# Patient Record
Sex: FEMALE | Race: WHITE | Employment: FULL TIME | URBAN - METROPOLITAN AREA
[De-identification: names, ages, dates, MRNs, and addresses within clinical notes are randomized per-mention and may not be internally consistent; named-entity substitution may affect disease eponyms.]

---

## 2019-01-31 ENCOUNTER — OFFICE VISIT (OUTPATIENT)
Dept: ORTHOPEDIC SURGERY | Age: 59
End: 2019-01-31

## 2019-01-31 VITALS
SYSTOLIC BLOOD PRESSURE: 143 MMHG | HEIGHT: 67 IN | HEART RATE: 79 BPM | WEIGHT: 160 LBS | BODY MASS INDEX: 25.11 KG/M2 | OXYGEN SATURATION: 98 % | TEMPERATURE: 97.7 F | DIASTOLIC BLOOD PRESSURE: 81 MMHG | RESPIRATION RATE: 16 BRPM

## 2019-01-31 DIAGNOSIS — M25.561 CHRONIC PAIN OF RIGHT KNEE: ICD-10-CM

## 2019-01-31 DIAGNOSIS — G89.29 CHRONIC PAIN OF RIGHT KNEE: ICD-10-CM

## 2019-01-31 DIAGNOSIS — M25.361 INSTABILITY OF RIGHT KNEE JOINT: Primary | ICD-10-CM

## 2019-01-31 DIAGNOSIS — Z96.651 HISTORY OF RIGHT KNEE JOINT REPLACEMENT: ICD-10-CM

## 2019-01-31 RX ORDER — CLOBETASOL PROPIONATE 0.05 G/100ML
SHAMPOO TOPICAL
COMMUNITY
Start: 2018-11-06 | End: 2022-08-03

## 2019-01-31 RX ORDER — SUMATRIPTAN 50 MG/1
TABLET, FILM COATED ORAL
COMMUNITY
Start: 2018-12-13

## 2019-01-31 RX ORDER — TELMISARTAN AND HYDROCHLORTHIAZIDE 40; 12.5 MG/1; MG/1
TABLET ORAL
COMMUNITY
Start: 2019-01-24

## 2019-01-31 RX ORDER — GUAIFENESIN AND PSEUDOEPHEDRINE HYDROCHLORIDE 600; 60 MG/1; MG/1
TABLET, EXTENDED RELEASE ORAL
COMMUNITY
Start: 2018-12-28

## 2019-01-31 RX ORDER — PANTOPRAZOLE SODIUM 40 MG/1
TABLET, DELAYED RELEASE ORAL
COMMUNITY
Start: 2019-01-27

## 2019-01-31 RX ORDER — OXYMETAZOLINE HCL 0.05 %
SPRAY, NON-AEROSOL (ML) NASAL
COMMUNITY
Start: 2018-12-14

## 2019-01-31 RX ORDER — BUPROPION HYDROCHLORIDE 150 MG/1
TABLET ORAL
Refills: 0 | COMMUNITY
Start: 2018-11-12

## 2019-01-31 RX ORDER — METOPROLOL SUCCINATE 100 MG/1
TABLET, EXTENDED RELEASE ORAL
COMMUNITY
Start: 2018-10-28

## 2019-01-31 RX ORDER — CARBOXYMETHYLCELLULOSE SODIUM 5 MG/ML
SOLUTION/ DROPS OPHTHALMIC
COMMUNITY
Start: 2018-12-17

## 2019-01-31 RX ORDER — GUAIFENESIN 600 MG/1
TABLET, EXTENDED RELEASE ORAL
COMMUNITY
Start: 2018-12-14

## 2019-01-31 RX ORDER — RIZATRIPTAN BENZOATE 10 MG/1
TABLET ORAL
COMMUNITY
Start: 2018-12-14

## 2019-01-31 RX ORDER — MINERAL OIL
ENEMA (ML) RECTAL
COMMUNITY
Start: 2019-01-29

## 2019-01-31 RX ORDER — AZITHROMYCIN 500 MG/1
TABLET, FILM COATED ORAL
COMMUNITY
Start: 2018-12-19 | End: 2022-08-03

## 2019-01-31 RX ORDER — METHYLPHENIDATE HYDROCHLORIDE 72 MG/1
TABLET, EXTENDED RELEASE ORAL
COMMUNITY
Start: 2018-12-20

## 2019-01-31 RX ORDER — FLUCONAZOLE 150 MG/1
TABLET ORAL
COMMUNITY
Start: 2018-12-19 | End: 2022-08-03

## 2019-01-31 RX ORDER — ACETAMINOPHEN 500 MG/1
TABLET, FILM COATED ORAL
COMMUNITY
Start: 2018-12-14

## 2019-01-31 RX ORDER — SIMETHICONE 80 MG
TABLET,CHEWABLE ORAL
COMMUNITY
Start: 2019-01-27

## 2019-01-31 RX ORDER — FERROUS FUMARATE/DOCUSATE 150-100 MG
TABLET, EXTENDED RELEASE ORAL
COMMUNITY
Start: 2018-12-14

## 2019-01-31 RX ORDER — ZOLPIDEM TARTRATE 10 MG/1
TABLET ORAL
COMMUNITY
Start: 2018-11-30

## 2019-01-31 RX ORDER — FLUTICASONE PROPIONATE 50 MCG
SPRAY, SUSPENSION (ML) NASAL
COMMUNITY
Start: 2018-12-14

## 2019-01-31 NOTE — PROGRESS NOTES
Patient: Natanael Silvestre                MRN: [id number]       SSN: [id number]  YOB: 1960        AGE: 59 y.o. SEX: female  Body mass index is 25.44 kg/m². PCP: No primary care provider on file. 01/31/19    HISTORY:  I had the pleasure of seeing Natanael Silvestre for opinion and advice with regards to her knee. Dr. Marcelino Mckay at [facility] replaced her knee. It looks like she has a Kristian cruciate-retaining knee. She had a knee replacement done about three or four years ago after a knee scope, and she has developed significant instability, and that is her main complaint, wobbliness of the knee. It does swell on her. She had a bone scan at the [facility] about one year ago, and it is incomplete. We are going to get the full result. I am also going to get her OP note, which shows likely a Kristian replacement by Dr. Dav Pelletier. She denies having problems with infection or DVT. She works in the school as an assistant, and she is on her feet many hours a day. She is having difficulty with it and noticed that she has become more valgused or knock-kneed associated with this. PHYSICAL EXAMINATION:  On examination today, she has a touch of recurvatum with the knee itself. There is a well healed incision. The quadriceps are intact, and she has severe mid-flexion instability associated with it. RADIOGRAPHS:  On x-rays, she has a Kristian replacement with a little bit a lysis behind the tray. I do not think the implants are grossly loose, but I would like to have a look at the bone scan. There may be some mild loosening on the tibia side and a fair bit of cement down the canal.     PLAN:  She is not happy to hear that she needs a revision. Depending on the bone scan, as she has no start-up pain, we could consider a larger bearing, and I think it is about 50/50 whether this will work, because this is likely a cruciate-retaining knee. She may need a full revision to a constrained implant. The other thing we discussed was chronic pain. She may not be able to stand on concrete floors for eight hours at a time. We can certainly improve the stability, but her standing tolerance may be limited, and I wanted to explain this to her. Also, infection, DVT, pulmonary embolism, anesthetic complications, blood loss requiring transfusion, pain, stiffness, and other complications were discussed. We are going to see her back in a couple of weeks time with some infectious labs. I would like to see the bone scan report, and I would also like to see the operative note as well. cc:  Jayce Anderson M.D. REVIEW OF SYSTEMS:      CON: negative for weight loss, fever  EYE: negative for double vision  ENT: negative for hoarseness  RS:   negative for Tb  GI:    negative for blood in stool  :  negative for blood in urine  Other systems reviewed and noted below. History reviewed. No pertinent past medical history. History reviewed. No pertinent family history.     Current Outpatient Medications   Medication Sig Dispense Refill    TYLENOL EXTRA STRENGTH 500 mg tablet       buPROPion XL (WELLBUTRIN XL) 150 mg tablet take 1 tablet by mouth IN THE MORNING FOR 3 MONTHS  0    REFRESH TEARS 0.5 % drop ophthalmic solution       fexofenadine (ALLEGRA) 180 mg tablet       fluconazole (DIFLUCAN) 150 mg tablet       MUCINEX 600 mg ER tablet       methylphenidate HCl 72 mg tr24       metoprolol succinate (TOPROL-XL) 100 mg tablet       pantoprazole (PROTONIX) 40 mg tablet       NASAL DECONGESTANT, PSEUDOEPH, 30 mg tablet       rizatriptan (MAXALT) 10 mg tablet       SUMAtriptan (IMITREX) 50 mg tablet       telmisartan-hydroCHLOROthiazide (MICARDIS HCT) 40-12.5 mg per tablet       zolpidem (AMBIEN) 10 mg tablet       simethicone (MYLICON) 80 mg chewable tablet       fluticasone (FLONASE) 50 mcg/actuation nasal spray       CLOBEX 0.05 % sham       azithromycin (ZITHROMAX) 500 mg tab       NASAL DECONGESTANT, OXYMETAZL, 0.05 % nasal spray       MUCINEX D  mg per tablet          Allergies   Allergen Reactions    Vicodin [Hydrocodone-Acetaminophen] Nausea and Vomiting       Past Surgical History:   Procedure Laterality Date    HX KNEE REPLACEMENT Right 2017       Social History     Socioeconomic History    Marital status: UNKNOWN     Spouse name: Not on file    Number of children: Not on file    Years of education: Not on file    Highest education level: Not on file   Social Needs    Financial resource strain: Not on file    Food insecurity - worry: Not on file    Food insecurity - inability: Not on file   [employer] needs - medical: Not on file   [employer] needs - non-medical: Not on file   Occupational History    Not on file   Tobacco Use    Smoking status: Never Smoker    Smokeless tobacco: Never Used   Substance and Sexual Activity    Alcohol use: Yes     Alcohol/week: 1.2 oz     Types: 2 Glasses of wine per week     Frequency: Monthly or less    Drug use: No    Sexual activity: Not Currently   Other Topics Concern    Not on file   Social History Narrative    Not on file       Visit Vitals  /81 (BP 1 Location: Left arm, BP Patient Position: Sitting)   Pulse 79   Temp 97.7 °F (36.5 °C) (Oral)   Resp 16   Ht 5' 6.5\" (1.689 m)   Wt 160 lb (72.6 kg)   LMP  (LMP Unknown)   SpO2 98%   BMI 25.44 kg/m²         PHYSICAL EXAMINATION:  GENERAL: Alert and oriented x3, in no acute distress, well-developed, well-nourished, afebrile. HEART: No JVD. EYES: No scleral icterus   NECK: No significant lymphadenopathy   LUNGS: No respiratory compromise or indrawing  ABDOMEN: Soft, non-tender, non-distended. Electronically signed by:  Lupe Starr MD

## 2019-02-07 ENCOUNTER — HOSPITAL ENCOUNTER (OUTPATIENT)
Dept: LAB | Age: 59
Discharge: HOME | End: 2019-02-07
Payer: OTHER GOVERNMENT

## 2019-02-07 ENCOUNTER — CLINICAL SUPPORT (OUTPATIENT)
Dept: FAMILY MEDICINE CLINIC | Age: 59
End: 2019-02-07

## 2019-02-07 DIAGNOSIS — M25.561 CHRONIC PAIN OF RIGHT KNEE: ICD-10-CM

## 2019-02-07 DIAGNOSIS — M25.569 KNEE PAIN, UNSPECIFIED CHRONICITY, UNSPECIFIED LATERALITY: Primary | ICD-10-CM

## 2019-02-07 DIAGNOSIS — G89.29 CHRONIC PAIN OF RIGHT KNEE: ICD-10-CM

## 2019-02-07 DIAGNOSIS — Z96.651 HISTORY OF RIGHT KNEE JOINT REPLACEMENT: ICD-10-CM

## 2019-02-07 DIAGNOSIS — M25.361 INSTABILITY OF RIGHT KNEE JOINT: ICD-10-CM

## 2019-02-07 LAB
BASOPHILS # BLD: 0 K/UL (ref 0–0.1)
BASOPHILS NFR BLD: 0 % (ref 0–2)
CRP SERPL-MCNC: 0.5 MG/DL (ref 0–0.3)
DIFFERENTIAL METHOD BLD: ABNORMAL
EOSINOPHIL # BLD: 0.1 K/UL (ref 0–0.4)
EOSINOPHIL NFR BLD: 3 % (ref 0–5)
ERYTHROCYTE [DISTWIDTH] IN BLOOD BY AUTOMATED COUNT: 13.2 % (ref 11.6–14.5)
ERYTHROCYTE [SEDIMENTATION RATE] IN BLOOD: 7 MM/HR (ref 0–30)
HCT VFR BLD AUTO: 40.9 % (ref 35–45)
HGB BLD-MCNC: 13.4 G/DL (ref 12–16)
LYMPHOCYTES # BLD: 1.8 K/UL (ref 0.9–3.6)
LYMPHOCYTES NFR BLD: 35 % (ref 21–52)
MCH RBC QN AUTO: 33 PG (ref 24–34)
MCHC RBC AUTO-ENTMCNC: 32.8 G/DL (ref 31–37)
MCV RBC AUTO: 100.7 FL (ref 74–97)
MONOCYTES # BLD: 0.5 K/UL (ref 0.05–1.2)
MONOCYTES NFR BLD: 9 % (ref 3–10)
NEUTS SEG # BLD: 2.8 K/UL (ref 1.8–8)
NEUTS SEG NFR BLD: 53 % (ref 40–73)
PLATELET # BLD AUTO: 309 K/UL (ref 135–420)
PMV BLD AUTO: 10.1 FL (ref 9.2–11.8)
RBC # BLD AUTO: 4.06 M/UL (ref 4.2–5.3)
URATE SERPL-MCNC: 3.9 MG/DL (ref 2.6–7.2)
WBC # BLD AUTO: 5.3 K/UL (ref 4.6–13.2)

## 2019-02-07 PROCEDURE — 84550 ASSAY OF BLOOD/URIC ACID: CPT

## 2019-02-07 PROCEDURE — 85652 RBC SED RATE AUTOMATED: CPT

## 2019-02-07 PROCEDURE — 83520 IMMUNOASSAY QUANT NOS NONAB: CPT

## 2019-02-07 PROCEDURE — 86140 C-REACTIVE PROTEIN: CPT

## 2019-02-07 PROCEDURE — 85025 COMPLETE CBC W/AUTO DIFF WBC: CPT

## 2019-02-12 LAB — IL6 SERPL-MCNC: 1.8 PG/ML (ref 0–15.5)

## 2019-02-15 ENCOUNTER — OFFICE VISIT (OUTPATIENT)
Dept: ORTHOPEDIC SURGERY | Age: 59
End: 2019-02-15

## 2019-02-15 VITALS
DIASTOLIC BLOOD PRESSURE: 79 MMHG | OXYGEN SATURATION: 96 % | SYSTOLIC BLOOD PRESSURE: 129 MMHG | HEIGHT: 67 IN | BODY MASS INDEX: 25.43 KG/M2 | HEART RATE: 72 BPM | WEIGHT: 162 LBS

## 2019-02-15 DIAGNOSIS — M25.361 INSTABILITY OF RIGHT KNEE JOINT: ICD-10-CM

## 2019-02-15 DIAGNOSIS — G89.29 CHRONIC PAIN OF RIGHT KNEE: ICD-10-CM

## 2019-02-15 DIAGNOSIS — T84.012A FAILED TOTAL RIGHT KNEE REPLACEMENT, INITIAL ENCOUNTER (HCC): Primary | ICD-10-CM

## 2019-02-15 DIAGNOSIS — M25.561 CHRONIC PAIN OF RIGHT KNEE: ICD-10-CM

## 2019-02-15 NOTE — PROGRESS NOTES
Patient: Michelle Farrar                MRN: [id number]       SSN: [id number]  YOB: 1960        AGE: 59 y.o. SEX: female  Body mass index is 25.76 kg/m². PCP: No primary care provider on file. 02/15/19    HISTORY:  I had the pleasure of reviewing Ms. Elie Lepe. She had her knee replacement done at the [facility], and she does have a Kristian. It is a cruciate-retaining. The tibia will accept posterior stabilized or semi-constrained insert with a revision femur. She does have hypermobility syndrome. She can hyperextend at the elbow. She used to be able to do the splits and can touch her thumb to the distal radius. The infectious workup is negative, and she is a bit frustrated that she might require some surgery. PHYSICAL EXAMINATION:  On examination today, she is a slim lady in no acute distress. She does have significant mid-flexion instability involving the knee and it does go into recurvatum as well. Both feet are warm and well perfused. There is no cyanosis, peripheral edema, or clubbing. RADIOGRAPHS:  X-rays confirm fairly well-cemented knee replacement, cruciate-retaining Kristian. I think she has a small osteophyte with her patella as well. PLAN:  As I have explained to her at the previous visit, I think needs a revision when she would like, a hinged metal knee brace will help controls her symptoms. Having said this, I would recommend surgery. If we are mac, just a larger bearing, but I do suspect she will need to have the femur off and conversion to a more constrained insert. Risks and benefits were described. She is going to think about things, and I would be very happy to help her with surgery if she so desires.      cc: Family Medicine          REVIEW OF SYSTEMS:      CON: negative for weight loss, fever  EYE: negative for double vision  ENT: negative for hoarseness  RS:   negative for Tb  GI:    negative for blood in stool  :  negative for blood in urine  Other systems reviewed and noted below. History reviewed. No pertinent past medical history. History reviewed. No pertinent family history.     Current Outpatient Medications   Medication Sig Dispense Refill    TYLENOL EXTRA STRENGTH 500 mg tablet       buPROPion XL (WELLBUTRIN XL) 150 mg tablet take 1 tablet by mouth IN THE MORNING FOR 3 MONTHS  0    REFRESH TEARS 0.5 % drop ophthalmic solution       azithromycin (ZITHROMAX) 500 mg tab       fexofenadine (ALLEGRA) 180 mg tablet       fluconazole (DIFLUCAN) 150 mg tablet       MUCINEX 600 mg ER tablet       methylphenidate HCl 72 mg tr24       metoprolol succinate (TOPROL-XL) 100 mg tablet       pantoprazole (PROTONIX) 40 mg tablet       NASAL DECONGESTANT, PSEUDOEPH, 30 mg tablet       rizatriptan (MAXALT) 10 mg tablet       SUMAtriptan (IMITREX) 50 mg tablet       telmisartan-hydroCHLOROthiazide (MICARDIS HCT) 40-12.5 mg per tablet       zolpidem (AMBIEN) 10 mg tablet       simethicone (MYLICON) 80 mg chewable tablet       NASAL DECONGESTANT, OXYMETAZL, 0.05 % nasal spray       MUCINEX D  mg per tablet       fluticasone (FLONASE) 50 mcg/actuation nasal spray       CLOBEX 0.05 % sham          Allergies   Allergen Reactions    Vicodin [Hydrocodone-Acetaminophen] Nausea and Vomiting       Past Surgical History:   Procedure Laterality Date    HX KNEE REPLACEMENT Right 2017       Social History     Socioeconomic History    Marital status: UNKNOWN     Spouse name: Not on file    Number of children: Not on file    Years of education: Not on file    Highest education level: Not on file   Social Needs    Financial resource strain: Not on file    Food insecurity - worry: Not on file    Food insecurity - inability: Not on file   [employer] needs - medical: Not on file   [employer] needs - non-medical: Not on file   Occupational History    Not on file   Tobacco Use    Smoking status: Never Smoker    Smokeless tobacco: Never Used   Substance and Sexual Activity    Alcohol use: Yes     Alcohol/week: 1.2 oz     Types: 2 Glasses of wine per week     Frequency: Monthly or less    Drug use: No    Sexual activity: Not Currently   Other Topics Concern    Not on file   Social History Narrative    Not on file       Visit Vitals  /79   Pulse 72   Ht 5' 6.5\" (1.689 m)   Wt 162 lb (73.5 kg)   LMP  (LMP Unknown)   SpO2 96%   BMI 25.76 kg/m²         PHYSICAL EXAMINATION:  GENERAL: Alert and oriented x3, in no acute distress, well-developed, well-nourished, afebrile. HEART: No JVD. EYES: No scleral icterus   NECK: No significant lymphadenopathy   LUNGS: No respiratory compromise or indrawing  ABDOMEN: Soft, non-tender, non-distended. Electronically signed by:  Ira Cortes MD

## 2023-01-31 RX ORDER — RIZATRIPTAN BENZOATE 10 MG/1
TABLET ORAL
COMMUNITY
Start: 2018-12-14

## 2023-01-31 RX ORDER — ZOLPIDEM TARTRATE 10 MG/1
TABLET ORAL
COMMUNITY
Start: 2018-11-30

## 2023-01-31 RX ORDER — CARBOXYMETHYLCELLULOSE SODIUM 5 MG/ML
SOLUTION/ DROPS OPHTHALMIC
COMMUNITY
Start: 2018-12-17

## 2023-01-31 RX ORDER — BUPROPION HYDROCHLORIDE 150 MG/1
TABLET ORAL
COMMUNITY
Start: 2018-11-12

## 2023-01-31 RX ORDER — FLUTICASONE PROPIONATE 50 MCG
SPRAY, SUSPENSION (ML) NASAL
COMMUNITY
Start: 2018-12-14

## 2023-01-31 RX ORDER — ACETAMINOPHEN 500 MG
TABLET ORAL
COMMUNITY
Start: 2018-12-14

## 2023-01-31 RX ORDER — FEXOFENADINE HCL 180 MG/1
TABLET ORAL
COMMUNITY
Start: 2019-01-29

## 2023-01-31 RX ORDER — METHYLPHENIDATE HYDROCHLORIDE 72 MG/1
TABLET, EXTENDED RELEASE ORAL
COMMUNITY
Start: 2018-12-20

## 2023-01-31 RX ORDER — PANTOPRAZOLE SODIUM 40 MG/1
TABLET, DELAYED RELEASE ORAL
COMMUNITY
Start: 2019-01-27

## 2023-01-31 RX ORDER — TOPIRAMATE 25 MG/1
25 TABLET ORAL DAILY
COMMUNITY
Start: 2022-07-17

## 2023-01-31 RX ORDER — OXYMETAZOLINE HYDROCHLORIDE 0.05 G/100ML
SPRAY NASAL
COMMUNITY
Start: 2018-12-14

## 2023-01-31 RX ORDER — SERTRALINE HYDROCHLORIDE 25 MG/1
25 TABLET, FILM COATED ORAL DAILY
COMMUNITY
Start: 2022-07-07

## 2023-01-31 RX ORDER — METOPROLOL SUCCINATE 100 MG/1
TABLET, EXTENDED RELEASE ORAL
COMMUNITY
Start: 2018-10-28

## 2023-01-31 RX ORDER — DESONIDE 0.5 MG/G
1 CREAM TOPICAL DAILY
COMMUNITY
Start: 2022-06-30

## 2023-01-31 RX ORDER — GUAIFENESIN 600 MG/1
TABLET, EXTENDED RELEASE ORAL
COMMUNITY
Start: 2018-12-14

## 2023-01-31 RX ORDER — TELMISARTAN AND HYDROCHLORTHIAZIDE 40; 12.5 MG/1; MG/1
TABLET ORAL
COMMUNITY
Start: 2019-01-24

## 2023-01-31 RX ORDER — PSEUDOEPHEDRINE HCL 30 MG
TABLET ORAL
COMMUNITY
Start: 2018-12-14

## 2023-01-31 RX ORDER — SUMATRIPTAN 50 MG/1
TABLET, FILM COATED ORAL
COMMUNITY
Start: 2018-12-13

## 2023-01-31 RX ORDER — GUAIFENESIN, PSEUDOEPHEDRINE HYDROCHLORIDE 600; 60 MG/1; MG/1
TABLET, EXTENDED RELEASE ORAL
COMMUNITY
Start: 2018-12-28

## 2023-01-31 RX ORDER — ALPRAZOLAM 0.5 MG/1
0.5 TABLET ORAL 2 TIMES DAILY PRN
COMMUNITY
Start: 2022-05-31

## 2023-01-31 RX ORDER — SIMETHICONE 80 MG
TABLET,CHEWABLE ORAL
COMMUNITY
Start: 2019-01-27